# Patient Record
Sex: FEMALE | Race: WHITE
[De-identification: names, ages, dates, MRNs, and addresses within clinical notes are randomized per-mention and may not be internally consistent; named-entity substitution may affect disease eponyms.]

---

## 2017-10-29 ENCOUNTER — HEALTH MAINTENANCE LETTER (OUTPATIENT)
Age: 37
End: 2017-10-29

## 2019-01-24 ENCOUNTER — TRANSFERRED RECORDS (OUTPATIENT)
Dept: HEALTH INFORMATION MANAGEMENT | Facility: CLINIC | Age: 39
End: 2019-01-24

## 2019-09-12 ENCOUNTER — TRANSFERRED RECORDS (OUTPATIENT)
Dept: HEALTH INFORMATION MANAGEMENT | Facility: CLINIC | Age: 39
End: 2019-09-12

## 2019-11-18 ENCOUNTER — MEDICAL CORRESPONDENCE (OUTPATIENT)
Dept: HEALTH INFORMATION MANAGEMENT | Facility: CLINIC | Age: 39
End: 2019-11-18

## 2019-11-21 NOTE — TELEPHONE ENCOUNTER
RECORDS RECEIVED FROM: chronic pain of right knee, patellar tendinitis, s/p right knee arthroscopy. Erik Severson referring   DATE RECEIVED: Nov 25, 2019     NOTES STATUS DETAILS   OFFICE NOTE from referring provider Care Everywhere  Severson, Erik P    OFFICE NOTE from other specialist N/A    DISCHARGE SUMMARY from hospital N/A    DISCHARGE REPORT from the ER N/A    OPERATIVE REPORT Care Everywhere 1/24/19 Severson, Erik P, MD   MEDICATION LIST Care Everywhere    IMPLANT RECORD/STICKER N/A    LABS     CBC/DIFF N/A    CULTURES N/A    INJECTIONS DONE IN RADIOLOGY N/A    MRI Received    CT SCAN N/A    XRAYS (IMAGES & REPORTS) Received    TUMOR     PATHOLOGY  Slides & report N/A      11/21/19   8:53 AM   Pre-visit complete  Hanna Glamm, CMA

## 2019-11-25 ENCOUNTER — PRE VISIT (OUTPATIENT)
Dept: ORTHOPEDICS | Facility: CLINIC | Age: 39
End: 2019-11-25

## 2019-11-25 ENCOUNTER — OFFICE VISIT (OUTPATIENT)
Dept: ORTHOPEDICS | Facility: CLINIC | Age: 39
End: 2019-11-25
Payer: COMMERCIAL

## 2019-11-25 VITALS — HEIGHT: 69 IN | BODY MASS INDEX: 34.66 KG/M2 | WEIGHT: 234 LBS

## 2019-11-25 DIAGNOSIS — M25.561 CHRONIC PAIN OF RIGHT KNEE: Primary | ICD-10-CM

## 2019-11-25 DIAGNOSIS — G89.29 CHRONIC PAIN OF RIGHT KNEE: Primary | ICD-10-CM

## 2019-11-25 RX ORDER — PANTOPRAZOLE SODIUM 40 MG/1
40 TABLET, DELAYED RELEASE ORAL
COMMUNITY
Start: 2019-07-08

## 2019-11-25 ASSESSMENT — MIFFLIN-ST. JEOR: SCORE: 1790.8

## 2019-11-25 NOTE — PROGRESS NOTES
CHIEF CONCERN: Right knee pain    HISTORY:   39-year-old female with chronic right knee pain which she describes as discomfort anteriorly over the distal aspect of the patellar tendon.  She has a history of a knee arthroscopy performed in January 2019 which time there is a chondroplasty and partial medial meniscectomy performed.  She has done well with her symptoms along the medial side of the knee since then but describes continued discomfort with her patellar tendon.  She has been treated extensively for patellar tendinitis with physical therapy and has had at this point to PRP injections which seem to have helped.  She endorses that discomfort bothers her the most during prolonged sitting and also with her recreational hobby of playing basketball when she needs to jump and cut during play.    PAST MEDICAL HISTORY: (Reviewed with the patient and in the [facility] medical record)  1. Breast Cancer in remission    PAST SURGICAL HISTORY: (Reviewed with the patient and in the [facility] medical record)  1. Partial mastectomy  2. Right knee surgery as above    MEDICATIONS: (Reviewed with the patient and in the [facility] medical record)    Notable medications include: None    ALLERGIES: (Reviewed with the patient and in the [facility] medical record)  1. Meperidine  2. Sulfa drugs      SOCIAL HISTORY: (Reviewed with the patient and in the medical record)  --Tobacco: No smoking  --Occupation: OR Charge Nurse  --Avocation/Sport: Basketball    FAMILY HISTORY: (Reviewed with the patient and in the medical record)  -- No family history of bleeding, clotting, or difficulty with anesthesia    REVIEW OF SYSTEMS: (Reviewed with the patient and on the health intake form)  -- A comprehensive 10 point review of systems was conducted and is negative except as noted in the HPI    EXAM:     General: Awake, Alert and Oriented, No acute Distress. Articulate and Interactive    Body mass index is 34.56 kg/m .    Right Lower extremity :    Skin is Warm and Well  perfused, no suggestion of infection    No effusion present, tenderness noted over the distal aspect of the patellar tendon    Range of motion from 0 to 125 degrees without significant pain     No patellar maltracking noted    Able to perform straight leg raise against resistance with good strength    Stable Lachman, anterior posterior drawer    No laxity with varus valgus stress    Negative Veronica's    EHL/FHL/TA/GS 5/5    Sensation intact L3-S1    2+ Dorsalis Pedis Pulse    IMAGING:    Plain Radiographs: Images demonstrate well-preserved joint space, no acute bony pathology    MRI: Images demonstrate significant amount of signal within the distal aspect of the patella tendon and a hypertrophic appearance of the tendon consistent with tendinosis, there is also some chondromalacia noted particular within the medial compartment of the knee and some possible tearing of the medial meniscus    ASSESSMENT:  1. 39-year-old female with chronic right-sided patellar tendinosis    PLAN:  1. Recommend continued conservative management with a focused therapy program detailing eccentric strengthening exercises and quadriceps strengthening.  We would also recommend if she has the ability to continue PRP injections that she do so as this may also provide added benefit.  We did discuss surgical treatment of this with debridement reattachment of the patellar tendon and feel that the outcomes with this are in general not reliably successful at eliminating symptoms completely.  She understands this and has come to an agreement with continued conservative management as a treatment plan. She may follow-up on an as-needed basis.    Jeremy Parker DO  Orthopedic Surgery Sports Medicine Fellow

## 2019-11-25 NOTE — NURSING NOTE
"Reason For Visit:   Chief Complaint   Patient presents with     Consult     Right knee         ?  No  Occupation: OR Charge Nurse   Currently working? Yes.  Work status?  Full time.  Date of injury: 11/2018  Type of injury: playing basketball and felt a pop in the knee while running down the court.  Date of surgery: 01/2019  Type of surgery: Right knee partial meniscectomy.  Smoker: No  Request smoking cessation information: No    Pain Assessment  Patient Currently in Pain: Yes  0-10 Pain Scale: 3  Primary Pain Location: Knee    Ht 1.753 m (5' 9\")   Wt 106.1 kg (234 lb)   LMP 04/10/2011   BMI 34.56 kg/m           Allergies   Allergen Reactions     Meperidine Hcl [Demerol]      Sulfa Drugs Hives       Current Outpatient Medications   Medication     pantoprazole (PROTONIX) 40 MG EC tablet     amylase-lipase-protease (ZENPEP) 93356 UNIT CPEP     ascorbic acid (VITAMIN C) 500 MG tablet     Multiple Vitamin (MULTI-VITAMIN) per tablet     nortriptyline (PAMELOR) 25 MG capsule     nortriptyline (PAMELOR) 25 MG capsule     omeprazole (PRILOSEC) 20 MG capsule     ondansetron (ZOFRAN) 8 MG tablet     No current facility-administered medications for this visit.          Johanny Sherwood, ATC    "

## 2019-11-25 NOTE — PROGRESS NOTES
Patient seen and examined with the resident.     Assesment: Distal patellar tendinitis right knee    Plan: I long discussion with the patient regarding her right knee.  At this time she really is hoping to avoid surgery and I think this is very reasonable.  She is going to try to get another PRP injection to her knee and I think this is a fine idea as well.  Were also to send her back for eccentric strengthening to see if this does not help.  I told her very clearly there is no role for prophylactic or preventative surgery so she certainly never needs to proceed with surgery although she fails to see lasting improvement.  She satisfied with the plan.  She will keep us posted.    I agree with history, physical and imaging as well as the assessment and plan as detailed by Dr. Parker.

## 2019-11-25 NOTE — LETTER
11/25/2019       RE: Cata Stein  [address]  [place] [address]     Dear Colleague,    Thank you for referring your patient, Cata Stein, to the [facility] at [facility]. Please see a copy of my visit note below.    CHIEF CONCERN: Right knee pain    HISTORY:   39-year-old female with chronic right knee pain which she describes as discomfort anteriorly over the distal aspect of the patellar tendon.  She has a history of a knee arthroscopy performed in January 2019 which time there is a chondroplasty and partial medial meniscectomy performed.  She has done well with her symptoms along the medial side of the knee since then but describes continued discomfort with her patellar tendon.  She has been treated extensively for patellar tendinitis with physical therapy and has had at this point to PRP injections which seem to have helped.  She endorses that discomfort bothers her the most during prolonged sitting and also with her recreational hobby of playing basketball when she needs to jump and cut during play.    PAST MEDICAL HISTORY: (Reviewed with the patient and in the [facility] medical record)  1. Breast Cancer in remission    PAST SURGICAL HISTORY: (Reviewed with the patient and in the [facility] medical record)  1. Partial mastectomy  2. Right knee surgery as above    MEDICATIONS: (Reviewed with the patient and in the [facility] medical record)    Notable medications include: None    ALLERGIES: (Reviewed with the patient and in the [facility] medical record)  1. Meperidine  2. Sulfa drugs      SOCIAL HISTORY: (Reviewed with the patient and in the medical record)  --Tobacco: No smoking  --Occupation: OR Charge Nurse  --Avocation/Sport: Basketball    FAMILY HISTORY: (Reviewed with the patient and in the medical record)  -- No family history of bleeding, clotting, or difficulty with anesthesia    REVIEW OF SYSTEMS: (Reviewed with the patient and on the health intake  form)  -- A comprehensive 10 point review of systems was conducted and is negative except as noted in the HPI    EXAM:     General: Awake, Alert and Oriented, No acute Distress. Articulate and Interactive    Body mass index is 34.56 kg/m .    Right Lower extremity :    Skin is Warm and Well perfused, no suggestion of infection    No effusion present, tenderness noted over the distal aspect of the patellar tendon    Range of motion from 0 to 125 degrees without significant pain     No patellar maltracking noted    Able to perform straight leg raise against resistance with good strength    Stable Lachman, anterior posterior drawer    No laxity with varus valgus stress    Negative Veronica's    EHL/FHL/TA/GS 5/5    Sensation intact L3-S1    2+ Dorsalis Pedis Pulse    IMAGING:    Plain Radiographs: Images demonstrate well-preserved joint space, no acute bony pathology    MRI: Images demonstrate significant amount of signal within the distal aspect of the patella tendon and a hypertrophic appearance of the tendon consistent with tendinosis, there is also some chondromalacia noted particular within the medial compartment of the knee and some possible tearing of the medial meniscus    ASSESSMENT:  1. 39-year-old female with chronic right-sided patellar tendinosis    PLAN:  1. Recommend continued conservative management with a focused therapy program detailing eccentric strengthening exercises and quadriceps strengthening.  We would also recommend if she has the ability to continue PRP injections that she do so as this may also provide added benefit.  We did discuss surgical treatment of this with debridement reattachment of the patellar tendon and feel that the outcomes with this are in general not reliably successful at eliminating symptoms completely.  She understands this and has come to an agreement with continued conservative management as a treatment plan. She may follow-up on an as-needed basis.    Jeremy Parker  DO  Orthopedic Surgery Sports Medicine Fellow          Patient seen and examined with the resident.     Assesment: Distal patellar tendinitis right knee    Plan: I long discussion with the patient regarding her right knee.  At this time she really is hoping to avoid surgery and I think this is very reasonable.  She is going to try to get another PRP injection to her knee and I think this is a fine idea as well.  Were also to send her back for eccentric strengthening to see if this does not help.  I told her very clearly there is no role for prophylactic or preventative surgery so she certainly never needs to proceed with surgery although she fails to see lasting improvement.  She satisfied with the plan.  She will keep us posted.    I agree with history, physical and imaging as well as the assessment and plan as detailed by Dr. Parker.       Again, thank you for allowing me to participate in the care of your patient.      Sincerely,    Rl Cage MD

## 2020-03-01 ENCOUNTER — HEALTH MAINTENANCE LETTER (OUTPATIENT)
Age: 40
End: 2020-03-01

## 2020-12-14 ENCOUNTER — HEALTH MAINTENANCE LETTER (OUTPATIENT)
Age: 40
End: 2020-12-14

## 2021-04-17 ENCOUNTER — HEALTH MAINTENANCE LETTER (OUTPATIENT)
Age: 41
End: 2021-04-17

## 2021-10-02 ENCOUNTER — HEALTH MAINTENANCE LETTER (OUTPATIENT)
Age: 41
End: 2021-10-02

## 2022-05-14 ENCOUNTER — HEALTH MAINTENANCE LETTER (OUTPATIENT)
Age: 42
End: 2022-05-14

## 2022-09-03 ENCOUNTER — HEALTH MAINTENANCE LETTER (OUTPATIENT)
Age: 42
End: 2022-09-03

## 2023-04-23 ENCOUNTER — HEALTH MAINTENANCE LETTER (OUTPATIENT)
Age: 43
End: 2023-04-23

## 2023-06-02 ENCOUNTER — HEALTH MAINTENANCE LETTER (OUTPATIENT)
Age: 43
End: 2023-06-02